# Patient Record
Sex: MALE | Race: WHITE | NOT HISPANIC OR LATINO | Employment: FULL TIME | URBAN - METROPOLITAN AREA
[De-identification: names, ages, dates, MRNs, and addresses within clinical notes are randomized per-mention and may not be internally consistent; named-entity substitution may affect disease eponyms.]

---

## 2024-01-05 ENCOUNTER — APPOINTMENT (OUTPATIENT)
Dept: CT IMAGING | Facility: CLINIC | Age: 49
End: 2024-01-05
Attending: FAMILY MEDICINE
Payer: OTHER MISCELLANEOUS

## 2024-01-05 ENCOUNTER — HOSPITAL ENCOUNTER (EMERGENCY)
Facility: CLINIC | Age: 49
Discharge: HOME | End: 2024-01-05
Attending: FAMILY MEDICINE | Admitting: FAMILY MEDICINE
Payer: OTHER MISCELLANEOUS

## 2024-01-05 VITALS
WEIGHT: 199.3 LBS | SYSTOLIC BLOOD PRESSURE: 127 MMHG | TEMPERATURE: 97.9 F | HEIGHT: 70 IN | OXYGEN SATURATION: 99 % | HEART RATE: 80 BPM | RESPIRATION RATE: 18 BRPM | DIASTOLIC BLOOD PRESSURE: 86 MMHG | BODY MASS INDEX: 28.53 KG/M2

## 2024-01-05 DIAGNOSIS — T14.8XXA FOREIGN BODY IN SUBCUTANEOUS TISSUE: ICD-10-CM

## 2024-01-05 PROCEDURE — 99284 EMERGENCY DEPT VISIT MOD MDM: CPT | Mod: 25 | Performed by: FAMILY MEDICINE

## 2024-01-05 PROCEDURE — 99284 EMERGENCY DEPT VISIT MOD MDM: CPT | Performed by: FAMILY MEDICINE

## 2024-01-05 PROCEDURE — 250N000009 HC RX 250: Performed by: EMERGENCY MEDICINE

## 2024-01-05 PROCEDURE — 70480 CT ORBIT/EAR/FOSSA W/O DYE: CPT

## 2024-01-05 RX ORDER — LIDOCAINE HYDROCHLORIDE AND EPINEPHRINE 10; 10 MG/ML; UG/ML
1 INJECTION, SOLUTION INFILTRATION; PERINEURAL ONCE
Status: DISCONTINUED | OUTPATIENT
Start: 2024-01-05 | End: 2024-01-05 | Stop reason: HOSPADM

## 2024-01-05 RX ORDER — PROPARACAINE HYDROCHLORIDE 5 MG/ML
1 SOLUTION/ DROPS OPHTHALMIC ONCE
Status: DISCONTINUED | OUTPATIENT
Start: 2024-01-05 | End: 2024-01-05 | Stop reason: HOSPADM

## 2024-01-05 RX ORDER — ERYTHROMYCIN 5 MG/G
OINTMENT OPHTHALMIC ONCE
Status: DISCONTINUED | OUTPATIENT
Start: 2024-01-05 | End: 2024-01-05

## 2024-01-05 RX ORDER — ERYTHROMYCIN 5 MG/G
OINTMENT OPHTHALMIC ONCE
Status: COMPLETED | OUTPATIENT
Start: 2024-01-05 | End: 2024-01-05

## 2024-01-05 RX ORDER — CEPHALEXIN 500 MG/1
500 CAPSULE ORAL 4 TIMES DAILY
Qty: 28 CAPSULE | Refills: 0 | Status: SHIPPED | OUTPATIENT
Start: 2024-01-05 | End: 2024-01-12

## 2024-01-05 RX ADMIN — ERYTHROMYCIN 1 G: 5 OINTMENT OPHTHALMIC at 17:31

## 2024-01-05 ASSESSMENT — ACTIVITIES OF DAILY LIVING (ADL)
ADLS_ACUITY_SCORE: 35

## 2024-01-05 ASSESSMENT — VISUAL ACUITY
OS: 20/30
OU: WITH CORRECTIVE LENSES
OD: 20/20
OU: NORMAL

## 2024-01-05 NOTE — ED TRIAGE NOTES
"Patient arrives stating, \"He has a piece of metal stuck on his eye (right).\" He states this happened yesterday at work, he's a . They did xrays yesterday and the MD stated it was too close to the eye for them to feel comfortable removing it. Denies any vision changes.      Triage Assessment (Adult)       Row Name 01/05/24 7799          Triage Assessment    Airway WDL WDL        Respiratory WDL    Respiratory WDL WDL        Skin Circulation/Temperature WDL    Skin Circulation/Temperature WDL WDL        Cardiac WDL    Cardiac WDL WDL     Cardiac Rhythm NSR        Peripheral/Neurovascular WDL    Peripheral Neurovascular WDL WDL        Cognitive/Neuro/Behavioral WDL    Cognitive/Neuro/Behavioral WDL WDL                     "

## 2024-01-05 NOTE — ED PROVIDER NOTES
"    [facility] EMERGENCY DEPARTMENT ([facility])    1/05/24      ED PROVIDER NOTE        History     Chief Complaint   Patient presents with    Foreign Body in Eye     Patient arrives stating, \"He has a piece of metal stuck on his eye (right).\" He states this happened yesterday at work, he's a . They did xrays yesterday and the MD stated it was too close to the eye for them to feel comfortable removing it. Denies any vision changes.      HPI  Ok Lynn is a 48 year old male with a history of atrial fibrillation, alcohol and marijuana  use, kidney stone presents to the ED from and outside clinic for evaluation of  metal FB in right eye.  No records in [facility], TDAP was updated at the clinic.  Was using a power tool hitting metallic bolts with a power hammer.  Shard of metal struck him on his right eyebrow which he thought was just a small bleeding laceration but then this morning he had a foreign body sensation and so took a magnet to the wound and felt that something moved.  He went to a clinic where he had an x-ray obtained, images not available however report states there is a 5.2 mm metallic foreign body in the lateral right orbit.  No visual change, no eye pain, no pain with extraocular movements, no discharge.  He has been struck in the eye with metallic foreign bodies and has had them removed twice previously.    [facility] record reviewed.    Xray eye 1/5/24:  FINDINGS:   5.2 millimeter ovoid radiodensity in the soft tissues about right lateral orbit. Mucous retention cyst right maxillary sinus measuring 2.2 cm.           Past Medical History  No past medical history on file.  No past surgical history on file.  No current outpatient medications on file.    No Known Allergies  Family History  No family history on file.  Social History          A medically appropriate review of systems was performed with pertinent positives and negatives noted in the HPI, and all other systems " "negative.    Physical Exam   BP: 127/86  Pulse: 80  Temp: 97.9  F (36.6  C)  Resp: 18  Height: 177.8 cm (5' 10\")  Weight: 90.4 kg (199 lb 4.8 oz)  SpO2: 99 %  Physical Exam  Vitals and nursing note reviewed.   Constitutional:       Appearance: Normal appearance.   HENT:      Head:        Nose: Nose normal.      Mouth/Throat:      Mouth: Mucous membranes are moist.   Eyes:      Extraocular Movements: Extraocular movements intact.      Conjunctiva/sclera: Conjunctivae normal.      Pupils: Pupils are equal, round, and reactive to light.   Cardiovascular:      Rate and Rhythm: Normal rate.   Pulmonary:      Effort: Pulmonary effort is normal.   Musculoskeletal:      Cervical back: Neck supple.   Neurological:      General: No focal deficit present.      Mental Status: He is alert and oriented to person, place, and time.           ED Course, Procedures, & Data      Procedures                      Results for orders placed or performed during the hospital encounter of 01/05/24   CT Orbits wo Contrast     Status: None    Narrative    CT ORBITAL WITHOUT CONTRAST  1/5/2024 1:12 PM     HISTORY: Evaluate for metallic foreign body.       COMPARISON: None      TECHNIQUE: Using thin collimation multidetector helical acquisition  technique, axial images were obtained through the orbits and upper  facial bones. Coronal and sagittal reformations were created, reviewed  and archived. Dose reduction techniques were used.    CONTRAST: None    FINDINGS:   Right preseptal periorbital swelling with a subdermal hyperdense  foreign body appearing focused along the superior lateral aspect of  the right orbit (axial series 2, image 47), inferolateral to the  orbital roof cortex of the frontal bone.     No extraconal, or intraconal edema. No intraorbital mass or hematoma.  Intact globes bilaterally. No proptosis.     No orbital wall fracture. Right maxillary floor polypoid mucosal  retention cyst. Otherwise clear paranasal sinuses and mastoid " air  cells. Intact cribriform plate. Visualized dentition is within normal  limits.    No focal abnormality of the visualized intracranial structures.      Impression    IMPRESSION:  1. Foreign body within the right superior lateral preseptal  periorbital subdermal tissue.  2. Otherwise normal CT of the orbits.    ROYCE JHA DO         SYSTEM ID:  [id number]     Medications   fluorescein (FUL-DARRON) ophthalmic strip 1 strip (has no administration in time range)   proparacaine (ALCAINE) 0.5 % ophthalmic solution 1 drop (has no administration in time range)   lidocaine 1% with EPINEPHrine 1:100,000 injection 1 mL (has no administration in time range)     Labs Ordered and Resulted from Time of ED Arrival to Time of ED Departure - No data to display  CT Orbits wo Contrast   Final Result   IMPRESSION:   1. Foreign body within the right superior lateral preseptal   periorbital subdermal tissue.   2. Otherwise normal CT of the orbits.      ROYCE JHA DO            SYSTEM ID:  [id number]             Critical care was not performed.     Medical Decision Making  The patient's presentation was of moderate complexity (an acute complicated injury).    The patient's evaluation involved:  review of external note(s) from 1 sources (reviewed note from referring clinic in [facility])  review of 1 test result(s) ordered prior to this encounter (reviewed report from prior imaging)  ordering and/or review of 1 test(s) in this encounter (see separate area of note for details)  independent interpretation of testing performed by another health professional (CT images personally reviewed also discussed with radiologist)  discussion of management or test interpretation with another health professional (ophthalmology consulted)    The patient's management necessitated moderate risk (prescription drug management including medications given in the ED), moderate risk (a decision regarding minor procedure (foreign body removal) with  risk factors of proximity to eyelid and globe), and further care after sign-out to Dr. Velasco (see their note for further management).    Assessment & Plan    A 48-year-old male who presents from an outside clinic where he was referred after he suffered a high-speed metallic foreign body injury to the superior orbit near the right eye.  Imaging at outside facility suggested orbit may be involved.  On exam his visual acuity is normal, the eye itself including the sclera globe and cornea appear normal, his extraocular movements are intact.  He has a small laceration above the right eyebrow what appears to be a subcutaneous metallic foreign body palpable.  CT was obtained to further confirm the location of the foreign body.  At this point the patient consented to an attempt at foreign body removal.  I made 2 attempts including extending the incision, however was unable to remove or locate the foreign body with a pickups, a mosquito clamp, and even a magnet.  At this point I discussed the case with ophthalmology who will come to the ED, and evaluate his eye further given the proximity of the injury and also make an attempt to remove.  He is being signed out to Dr. Velasco at shift change.    I have reviewed the nursing notes. I have reviewed the findings, diagnosis, plan and need for follow up with the patient.    New Prescriptions    No medications on file       Final diagnoses:   Foreign body in subcutaneous tissue - Right superior orbit       Frankie Amador MD    [facility] EMERGENCY DEPARTMENT  1/5/2024        Frankie Amador MD  01/05/24 3289

## 2024-01-05 NOTE — DISCHARGE INSTRUCTIONS
Please make an appointment to follow up with Eye Clinic (phone: [phone]) as needed.    Apply erythromycin ointment to eye three times a day for a week or until tube is empty.    Take Keflex for 7 days to prevent infection.    Return for vision changes, worsening pain, swelling, drainage, or redness.

## 2024-01-05 NOTE — PROCEDURES
PREOPERATIVE DIAGNOSIS: metallic foreign body, right brow  POSTOPERATIVE DIAGNOSIS: same  PROCEDURE PERFORMED: removal of metallic foreign body, right brow  SURGEON: Taylor Andrade MD   Assistant: Ama Schrader  ANESTHESIA: Topical 1% lidocaine with epinephrine  COMPLICATIONS: None.   ESTIMATED BLOOD LOSS: Less than 5 mL.   HISTORY AND INDICATIONS: Leah presented with a superficial metallic foreign body noted in the right brow on CT scan. After the risks, benefits and alternatives to the proposed procedure were explained to the patient, informed consent was obtained.   PROCEDURE: The patient was placed supine in ED bed. Local anesthesia was induced.  The area was prepped and draped in the typical sterile ophthalmic fashion. A magnet was used to identify an area of skin that elevated when the magnet was praneeth near. A single superficial incision was made over this area using an 11 blade. Brief exploration was performed and the metallic foreign body was removed using toothed forceps. Hemostasis was obtained using a high temp cautery. The skin was closed with interrupted 5-0 plain gut suture. The skin was cleaned and dried and erythromycin ophthalmic ointment was applied along the laceration. The patient tolerated the procedure well and was taken to recovery room in stable condition.     Taylor Andrade MD  Resident Physician, PGY-3  Department of Ophthalmology

## 2024-01-05 NOTE — ED PROVIDER NOTES
"     Emergency Department Patient Sign-out       Brief HPI:  This is a 48 year old male signed out to me by Dr. Amador .  See initial ED Provider note for details of the presentation.            Significant Events prior to my assuming care: Patient with piece of metal shrapnel stuck in eyebrow laceration. Ophtho consulted to remove.      Exam:   Patient Vitals for the past 24 hrs:   BP Temp Temp src Pulse Resp SpO2 Height Weight   01/05/24 1201 127/86 97.9  F (36.6  C) Oral 80 18 99 % 1.778 m (5' 10\") 90.4 kg (199 lb 4.8 oz)           ED RESULTS:   Results for orders placed or performed during the hospital encounter of 01/05/24 (from the past 24 hour(s))   CT Orbits wo Contrast     Status: None    Collection Time: 01/05/24  1:12 PM    Narrative    CT ORBITAL WITHOUT CONTRAST  1/5/2024 1:12 PM     HISTORY: Evaluate for metallic foreign body.       COMPARISON: None      TECHNIQUE: Using thin collimation multidetector helical acquisition  technique, axial images were obtained through the orbits and upper  facial bones. Coronal and sagittal reformations were created, reviewed  and archived. Dose reduction techniques were used.    CONTRAST: None    FINDINGS:   Right preseptal periorbital swelling with a subdermal hyperdense  foreign body appearing focused along the superior lateral aspect of  the right orbit (axial series 2, image 47), inferolateral to the  orbital roof cortex of the frontal bone.     No extraconal, or intraconal edema. No intraorbital mass or hematoma.  Intact globes bilaterally. No proptosis.     No orbital wall fracture. Right maxillary floor polypoid mucosal  retention cyst. Otherwise clear paranasal sinuses and mastoid air  cells. Intact cribriform plate. Visualized dentition is within normal  limits.    No focal abnormality of the visualized intracranial structures.      Impression    IMPRESSION:  1. Foreign body within the right superior lateral preseptal  periorbital subdermal tissue.  2. " Otherwise normal CT of the orbits.    ROYCE JHA DO         SYSTEM ID:  [id number]       ED MEDICATIONS:   Medications   fluorescein (FUL-DARRON) ophthalmic strip 1 strip (has no administration in time range)   proparacaine (ALCAINE) 0.5 % ophthalmic solution 1 drop (has no administration in time range)   lidocaine 1% with EPINEPHrine 1:100,000 injection 1 mL (has no administration in time range)   lidocaine 1% with EPINEPHrine 1:100,000 injection 1 mL (has no administration in time range)   erythromycin (ROMYCIN) ophthalmic ointment (has no administration in time range)         Impression:    ICD-10-CM    1. Foreign body in subcutaneous tissue  T14.8XXA     Right superior orbit          Plan:    Ophtho removed metal FB. They placed dissolvable sutures. They recommended discharging with erythromycin ointment for laceration and keflex. Follow up only as needed.        MD Rod Pelaez Jill C, MD  01/05/24 0132

## 2024-01-06 NOTE — CONSULTS
OPHTHALMOLOGY CONSULT NOTE  01/05/24    Patient: Ok Lynn  Consulted by: ED  Reason for Consult: metallic foreign body by the brow     HISTORY OF PRESENTING ILLNESS:     Ok Lynn is a 48 year old male who presents today due to concern for metallic foreign body in the right brow. States he was using a power tool hitting metallic bolts with a power hammer when a shard of metal struck him on his right eyebrow yesterday. Initially thought it was just small skin cut, but this AM, felt a foreign body sensation so took a magnet to the wound and felt that something moved.     He first presented to clinic where an x-ray was obtained which per report showed a metallic foreign body in the lateral right orbit.     Patient denies any vision changes, double vision, or eye pain.         Review of systems were otherwise negative except for that which has been stated above.      OCULAR/MEDICAL/SURGICAL HISTORIES:     Past Ocular History:  Last eye exam: About a year ago  Prior eye surgery/laser: Denies  Contact lens wear: Denies  Glasses: For distance  Eyedrops: Denies    Family History:  No FH of blindness    Social History:  Patient is a smoker     No past medical history on file.    No past surgical history on file.    EXAMINATION:     Base Eye Exam       Visual Acuity (Snellen - Linear)         Right Left    Dist sc 20/40 20/30    Dist ph sc 20/25 20/20              Tonometry (Tonopen, 4:08 PM)         Right Left    Pressure 14 15              Pupils         Dark Light Shape React APD    Right 3 2 Round Brisk None    Left 3 2 Round Brisk None              Visual Fields         Left Right     Full Full              Extraocular Movement         Right Left     Full, Ortho Full, Ortho              Dilation       Both eyes: 2.5% Jay Synephrine, 1.0% Mydriacyl @ 3:48 PM                  Slit Lamp and Fundus Exam       External Exam         Right Left    External laceration ~ 5mm in length at the lateral superior  orbital rim Normal              Slit Lamp Exam         Right Left    Lids/Lashes Normal, no edema or erythema, RUL sits slightly lower than the KRISTINA Normal    Conjunctiva/Sclera White and quiet, no conj laceration, no FB identified, fornices swept White and quiet    Cornea Rare PEE inferiorly, no epi defect Clear    Anterior Chamber Deep and quiet Deep and quiet    Iris Round and reactive -> pharm dilated, pupillary membrane inferiorly Round and reactive -> pharm dilated    Lens Clear Clear    Anterior Vitreous Normal Normal              Fundus Exam         Right Left    Disc Normal Normal    C/D Ratio 0.2 0.15    Macula Normal Normal    Vessels Normal Normal    Periphery Normal Normal                    Labs/Studies/Imaging Performed:  CT Orbit (01/05/24)   IMPRESSION:  1. Foreign body within the right superior lateral preseptal  periorbital subdermal tissue.  2. Otherwise normal CT of the orbits.     ASSESSMENT/PLAN:     Ok Lynn is a 48 year old male who presents with     # Foreign body in the periorbital subdermal tissue, right   - Removed bedside today with a sub-brow incision; see procedure note   - No signs of globe trauma (globe is formed, dieon negative, IOP WNL, vision is symmetric, AC is formed, no traumatic hyphema, unremarkable fundus exam)     RECOMMENDATIONS:  - Start keflex BID for 7 days  - Apply erythromycin ointment to the incision site TID until the tube runs out  - Follow up in eye clinic as needed     It is our pleasure to participate in this patient's care and treatment. Please contact us with any further questions or concerns.      Patient seen and discussed with senior resident Dr. Taylor Andrade and discussed with oculoplastics fellow Dr. Ayesha Taylor.     Ama Schrader MD  Resident Physician, PGY-2  Department of Ophthalmology